# Patient Record
Sex: MALE | Race: WHITE | NOT HISPANIC OR LATINO
[De-identification: names, ages, dates, MRNs, and addresses within clinical notes are randomized per-mention and may not be internally consistent; named-entity substitution may affect disease eponyms.]

---

## 2022-06-20 ENCOUNTER — FORM ENCOUNTER (OUTPATIENT)
Age: 22
End: 2022-06-20

## 2022-06-21 ENCOUNTER — APPOINTMENT (OUTPATIENT)
Dept: ORTHOPEDIC SURGERY | Facility: CLINIC | Age: 22
End: 2022-06-21
Payer: COMMERCIAL

## 2022-06-21 ENCOUNTER — APPOINTMENT (OUTPATIENT)
Dept: MRI IMAGING | Facility: CLINIC | Age: 22
End: 2022-06-21
Payer: COMMERCIAL

## 2022-06-21 VITALS — WEIGHT: 150 LBS | HEIGHT: 69 IN | BODY MASS INDEX: 22.22 KG/M2

## 2022-06-21 DIAGNOSIS — S53.401A UNSPECIFIED SPRAIN OF RIGHT ELBOW, INITIAL ENCOUNTER: ICD-10-CM

## 2022-06-21 PROCEDURE — 99203 OFFICE O/P NEW LOW 30 MIN: CPT

## 2022-06-21 PROCEDURE — 73080 X-RAY EXAM OF ELBOW: CPT | Mod: RT

## 2022-06-21 PROCEDURE — 73221 MRI JOINT UPR EXTREM W/O DYE: CPT | Mod: RT

## 2022-06-21 NOTE — ASSESSMENT
[FreeTextEntry1] : MRI right elbow is ordered. Will start PT. No sports at this time. May take advil.

## 2022-06-21 NOTE — PHYSICAL EXAM
[NL (150)] : flexion 150 degrees [NL (0)] : extension 0 degrees [NL (90)] : supination 90 degrees [Right] : right elbow [There are no fractures, subluxations or dislocations. No significant abnormalities are seen] : There are no fractures, subluxations or dislocations. No significant abnormalities are seen [] : negative milking

## 2022-06-21 NOTE — HISTORY OF PRESENT ILLNESS
[Right Arm] : right arm [Sports related] : sports related [Gradual] : gradual [8] : 8 [1] : 2 [Localized] : localized [Sharp] : sharp [Intermittent] : intermittent [Household chores] : household chores [Rest] : rest [Student] : Work status: student [] : Post Surgical Visit: no [FreeTextEntry3] : 5/30/2022 [FreeTextEntry5] : Patient complains of R Elbow pain 5 days after pitching  [de-identified] : Lifting or throwing [de-identified] : [facility]  [de-identified] : Ignacio [de-identified] : Baseball

## 2022-06-21 NOTE — REASON FOR VISIT
[Parent] : parent [FreeTextEntry2] : Patient is a 22 year old RHD  with complaints of Right medial Elbow pain for 3 weeks after pitching.Difficulty lifting or throwing. Has not played in 4 games. Had right elbow injury at the age of 14, treated with brace and PT. He currently plays [employer], but was "messing around pitching", threw about 50 pitches. No pain at that time, later on noted pain. Here with his mother today.

## 2022-06-24 ENCOUNTER — APPOINTMENT (OUTPATIENT)
Dept: ORTHOPEDIC SURGERY | Facility: CLINIC | Age: 22
End: 2022-06-24
Payer: COMMERCIAL

## 2022-06-24 VITALS — WEIGHT: 150 LBS | HEIGHT: 69 IN | BODY MASS INDEX: 22.22 KG/M2

## 2022-06-24 DIAGNOSIS — Z78.9 OTHER SPECIFIED HEALTH STATUS: ICD-10-CM

## 2022-06-24 DIAGNOSIS — S53.401D UNSPECIFIED SPRAIN OF RIGHT ELBOW, SUBSEQUENT ENCOUNTER: ICD-10-CM

## 2022-06-24 PROCEDURE — 99214 OFFICE O/P EST MOD 30 MIN: CPT

## 2022-06-24 NOTE — HISTORY OF PRESENT ILLNESS
[Right Arm] : right arm [Sports related] : sports related [Gradual] : gradual [4] : 4 [Dull/Aching] : dull/aching [Intermittent] : intermittent [Leisure] : leisure [Rest] : rest [Exercising] : exercising [Student] : Work status: student [] : Post Surgical Visit: no

## 2022-06-24 NOTE — REASON FOR VISIT
[Parent] : parent [FreeTextEntry2] : Patient complains of continued pain R Elbow. Patient has appointment made to start PT on Monday. He would like to go over MRI results today. He is here with his mother Cheri.

## 2022-06-24 NOTE — ASSESSMENT
[FreeTextEntry1] : MRI films reviewed with them in the office: no tears. Will start PT, refrain from BB for now.

## 2022-06-24 NOTE — PHYSICAL EXAM
[Right] : right elbow [NL (150)] : flexion 150 degrees [NL (0)] : extension 0 degrees [NL (90)] : supination 90 degrees [] : negative tinel's

## 2024-05-01 DIAGNOSIS — S76.311A STRAIN OF MUSCLE, FASCIA AND TENDON OF THE POSTERIOR MUSCLE GROUP AT THIGH LEVEL, RIGHT THIGH, INITIAL ENCOUNTER: ICD-10-CM

## 2024-05-01 RX ORDER — NAPROXEN 500 MG/1
500 TABLET ORAL
Qty: 60 | Refills: 0 | Status: ACTIVE | COMMUNITY
Start: 2024-05-01 | End: 1900-01-01

## 2025-05-12 ENCOUNTER — APPOINTMENT (OUTPATIENT)
Dept: ORTHOPEDIC SURGERY | Facility: CLINIC | Age: 25
End: 2025-05-12
Payer: COMMERCIAL

## 2025-05-12 DIAGNOSIS — H81.90 UNSPECIFIED DISORDER OF VESTIBULAR FUNCTION, UNSPECIFIED EAR: ICD-10-CM

## 2025-05-12 DIAGNOSIS — S06.0X0A CONCUSSION W/OUT LOSS OF CONSCIOUSNESS, INITIAL ENCOUNTER: ICD-10-CM

## 2025-05-12 DIAGNOSIS — H55.81 DEFICIENT SACCADIC EYE MOVEMENTS: ICD-10-CM

## 2025-05-12 PROCEDURE — 99204 OFFICE O/P NEW MOD 45 MIN: CPT

## 2025-05-28 ENCOUNTER — APPOINTMENT (OUTPATIENT)
Dept: ORTHOPEDIC SURGERY | Facility: CLINIC | Age: 25
End: 2025-05-28
Payer: COMMERCIAL

## 2025-05-28 VITALS — BODY MASS INDEX: 22.22 KG/M2 | WEIGHT: 150 LBS | HEIGHT: 69 IN

## 2025-05-28 DIAGNOSIS — S06.0X0A CONCUSSION W/OUT LOSS OF CONSCIOUSNESS, INITIAL ENCOUNTER: ICD-10-CM

## 2025-05-28 DIAGNOSIS — H81.90 UNSPECIFIED DISORDER OF VESTIBULAR FUNCTION, UNSPECIFIED EAR: ICD-10-CM

## 2025-05-28 PROCEDURE — 99213 OFFICE O/P EST LOW 20 MIN: CPT

## 2025-06-03 ENCOUNTER — NON-APPOINTMENT (OUTPATIENT)
Age: 25
End: 2025-06-03